# Patient Record
Sex: FEMALE | Race: OTHER | NOT HISPANIC OR LATINO | URBAN - METROPOLITAN AREA
[De-identification: names, ages, dates, MRNs, and addresses within clinical notes are randomized per-mention and may not be internally consistent; named-entity substitution may affect disease eponyms.]

---

## 2017-03-06 ENCOUNTER — EMERGENCY (EMERGENCY)
Facility: HOSPITAL | Age: 82
LOS: 1 days | Discharge: HOME | End: 2017-03-06
Attending: INTERNAL MEDICINE
Payer: MEDICARE

## 2017-03-06 VITALS
RESPIRATION RATE: 18 BRPM | SYSTOLIC BLOOD PRESSURE: 142 MMHG | TEMPERATURE: 99 F | HEART RATE: 66 BPM | OXYGEN SATURATION: 96 % | DIASTOLIC BLOOD PRESSURE: 57 MMHG

## 2017-03-06 VITALS
OXYGEN SATURATION: 96 % | DIASTOLIC BLOOD PRESSURE: 47 MMHG | WEIGHT: 199.96 LBS | HEART RATE: 62 BPM | HEIGHT: 60 IN | RESPIRATION RATE: 20 BRPM | SYSTOLIC BLOOD PRESSURE: 156 MMHG | TEMPERATURE: 98 F

## 2017-03-06 DIAGNOSIS — K94.23 GASTROSTOMY MALFUNCTION: ICD-10-CM

## 2017-03-06 DIAGNOSIS — F03.90 UNSPECIFIED DEMENTIA, UNSPECIFIED SEVERITY, WITHOUT BEHAVIORAL DISTURBANCE, PSYCHOTIC DISTURBANCE, MOOD DISTURBANCE, AND ANXIETY: ICD-10-CM

## 2017-03-06 DIAGNOSIS — R13.10 DYSPHAGIA, UNSPECIFIED: ICD-10-CM

## 2017-03-06 DIAGNOSIS — Z95.0 PRESENCE OF CARDIAC PACEMAKER: ICD-10-CM

## 2017-03-06 DIAGNOSIS — I69.359 HEMIPLEGIA AND HEMIPARESIS FOLLOWING CEREBRAL INFARCTION AFFECTING UNSPECIFIED SIDE: ICD-10-CM

## 2017-03-06 DIAGNOSIS — E11.9 TYPE 2 DIABETES MELLITUS WITHOUT COMPLICATIONS: ICD-10-CM

## 2017-03-06 PROCEDURE — 74000: CPT | Mod: 26

## 2017-03-06 PROCEDURE — 74000: CPT

## 2017-03-06 PROCEDURE — 99284 EMERGENCY DEPT VISIT MOD MDM: CPT | Mod: 25

## 2017-03-06 PROCEDURE — 43760: CPT

## 2017-03-06 PROCEDURE — 43760 CHANGE GASTROSTOMY TUBE PERCUTANEOUS W/O GUIDE: CPT

## 2017-03-06 PROCEDURE — 99283 EMERGENCY DEPT VISIT LOW MDM: CPT

## 2017-03-06 NOTE — ED ADULT NURSE REASSESSMENT NOTE - NS ED NURSE REASSESS COMMENT FT1
PEG tube replaced by MD Coleman and MERARI Acevedo, confirmed via xray. HHA at bedside. pending ambulance for transport back home.

## 2017-03-06 NOTE — ED PROVIDER NOTE - ATTENDING CONTRIBUTION TO CARE
88 YOF with dementia, non-verbal at baseline, cared for at home brought in for PEG tube dislodged this AM. Pt at baseline, no reports of fever, cough, vomiting, or other illness. PE- non verbal, not following commands, not ill appearing, otherwise WNL. Will replace PEG and confirm xray then d/c home with outpt f/u.

## 2017-03-06 NOTE — ED PROVIDER NOTE - OBJECTIVE STATEMENT
87 y/o female, PMHx stroke, HTN, non-verbal, BIB son for PEG tub replacement s/p falling out this morning. Pt has PEG tube s/p CVA and dysphagia x3 years ago, last replacement was in [facility] ED 11/10/16. No MD is managing pt PEG tube. Denies fever, abdominal pain, or any other concerns. Uncertain of PEG tube size.

## 2017-03-06 NOTE — ED PROCEDURE NOTE - CPROC ED FEED TUBE REPLAC DET1
Location was identified, and feeding tube inserted into the existing tract of the abdominal wall with easy passage. Tube was secured in place, and balloon inflated./18f tested balloon prior to insertion, inflated balloon, confirmed placement with gastroview via xray

## 2017-03-06 NOTE — ED ADULT NURSE NOTE - OBJECTIVE STATEMENT
pt BIBA from home with HHA by side for peg tub replacement. pt non-verbal. as per HHA, peg tube fell out today, needs to be replaced.

## 2017-03-06 NOTE — ED PROVIDER NOTE - MEDICAL DECISION MAKING DETAILS
87 y/o female requiring PEG tube replacement s/p balloon dysfunction today. Not managed by GI or PMD. Placed 16f, xray confirmation, no signs of infection. Gave GI recommendation and d/c home via ambulance and HHA.